# Patient Record
Sex: FEMALE | Race: BLACK OR AFRICAN AMERICAN | NOT HISPANIC OR LATINO | URBAN - NONMETROPOLITAN AREA
[De-identification: names, ages, dates, MRNs, and addresses within clinical notes are randomized per-mention and may not be internally consistent; named-entity substitution may affect disease eponyms.]

---

## 2020-10-16 ENCOUNTER — OFFICE VISIT (OUTPATIENT)
Dept: URBAN - NONMETROPOLITAN AREA CLINIC 2 | Facility: CLINIC | Age: 40
End: 2020-10-16

## 2020-10-23 ENCOUNTER — OFFICE VISIT (OUTPATIENT)
Dept: URBAN - NONMETROPOLITAN AREA CLINIC 2 | Facility: CLINIC | Age: 40
End: 2020-10-23
Payer: MEDICARE

## 2020-10-23 ENCOUNTER — DASHBOARD ENCOUNTERS (OUTPATIENT)
Age: 40
End: 2020-10-23

## 2020-10-23 ENCOUNTER — TELEPHONE ENCOUNTER (OUTPATIENT)
Dept: URBAN - NONMETROPOLITAN AREA CLINIC 2 | Facility: CLINIC | Age: 40
End: 2020-10-23

## 2020-10-23 DIAGNOSIS — Z12.11 COLON CANCER SCREENING: ICD-10-CM

## 2020-10-23 DIAGNOSIS — K31.84 GASTROPARESIS: ICD-10-CM

## 2020-10-23 DIAGNOSIS — R11.0 NAUSEA: ICD-10-CM

## 2020-10-23 DIAGNOSIS — K92.0 HEMATEMESIS: ICD-10-CM

## 2020-10-23 PROCEDURE — G9903 PT SCRN TBCO ID AS NON USER: HCPCS | Performed by: NURSE PRACTITIONER

## 2020-10-23 PROCEDURE — 99214 OFFICE O/P EST MOD 30 MIN: CPT | Performed by: NURSE PRACTITIONER

## 2020-10-23 PROCEDURE — G8482 FLU IMMUNIZE ORDER/ADMIN: HCPCS | Performed by: NURSE PRACTITIONER

## 2020-10-23 PROCEDURE — G8420 CALC BMI NORM PARAMETERS: HCPCS | Performed by: NURSE PRACTITIONER

## 2020-10-23 PROCEDURE — G8427 DOCREV CUR MEDS BY ELIG CLIN: HCPCS | Performed by: NURSE PRACTITIONER

## 2020-10-23 RX ORDER — PANTOPRAZOLE SODIUM 40 MG/1
1 TABLET TABLET, DELAYED RELEASE ORAL TWICE DAILY
Qty: 60 TABLET | Refills: 3 | OUTPATIENT
Start: 2020-10-23

## 2020-10-23 RX ORDER — ASPIRIN 81 MG/1
TABLET, COATED ORAL
Qty: 0 | Refills: 0 | Status: ACTIVE | COMMUNITY
Start: 1900-01-01

## 2020-10-23 RX ORDER — ONDANSETRON 4 MG/1
1 TABLET ON THE TONGUE AND ALLOW TO DISSOLVE TABLET, ORALLY DISINTEGRATING ORAL ONCE A DAY
Qty: 30 | OUTPATIENT
Start: 2020-10-23

## 2020-10-23 RX ORDER — METOCLOPRAMIDE HYDROCHLORIDE 10 MG/1
1 TABLET BEFORE MEALS TABLET ORAL
Qty: 120 TABLET | Refills: 3 | OUTPATIENT
Start: 2020-10-23

## 2020-10-23 RX ORDER — CALCIUM ACETATE 667 MG/1
TABLET ORAL
Qty: 0 | Refills: 0 | Status: ACTIVE | COMMUNITY
Start: 1900-01-01

## 2020-10-23 RX ORDER — METOCLOPRAMIDE 10 MG/1
TAKE 1 TABLET (10 MG) BY ORAL ROUTE 4 TIMES PER DAY 30 MINUTES BEFORE MEALS AND AT BEDTIME TABLET ORAL
Qty: 0 | Refills: 0 | Status: ACTIVE | COMMUNITY
Start: 1900-01-01

## 2020-10-23 RX ORDER — SUCRALFATE 1 G/1
1 TABLET ON AN EMPTY STOMACH TABLET ORAL THREE TIMES A DAY
Qty: 90 TABLET | Refills: 3 | OUTPATIENT
Start: 2020-10-23 | End: 2021-02-19

## 2020-10-23 RX ORDER — FAMOTIDINE 40 MG/1
TAKE 1 TABLET (40 MG) BY ORAL ROUTE ONCE DAILY AT BEDTIME FOR 90 DAYS TABLET, FILM COATED ORAL 1
Qty: 90 | Refills: 6 | Status: ACTIVE | COMMUNITY
Start: 2019-09-30 | End: 2021-06-21

## 2020-10-23 RX ORDER — PANTOPRAZOLE SODIUM 40 MG/1
TAKE 1 TABLET (40 MG) BY ORAL ROUTE 2 TIMES PER DAY FOR 90 DAYS TABLET, DELAYED RELEASE ORAL 2
Qty: 180 | Refills: 3 | Status: ACTIVE | COMMUNITY
Start: 2020-02-07 | End: 2021-02-01

## 2020-10-23 NOTE — HPI-TODAY'S VISIT:
10/23/20 Ms Jackson returns for f/u. Since her last OV, she had an UGI with unchanged HH and persistent compression of SB at the level of SMA. Dr Flor states she is not a candidate for any vascular intervention for this. She was doing well at the time and so we did pursue referral to Dr Joel or consideration of GJ. Today, she c/o heartburn at night as well as daily postpranial vomiting and coffee-ground/hematemesis. No additional complaints. It sounds like she is still on ppi, but not reglan.   1/30/20    Mrs. Jackson is a 40 yo F with a PMH of CAD s/p CABG with subsequent peripheral arterial disease leading to bilateral BKA, GP, HD M, W, F, and pancreatitis who f/u of hospital f/u. She was admitted to [facility] in Nov and Jan for SBO vs ileus and managed conservatively in both instances. She did have some hypocalcemia r/t parathyroid issues at the same time as these admissions. She states that she had loose stool and worsening nausea which prompted her to be seen in the ED. She does have chronic nausea and takes miralax for gastroparesis. She was seen by Dr Flor for SMA and he felt they were unable to offer interventions for this.   7/19 GEs incomplete due to vomiting. estimated emptying time 100 minutes.  7/18 CT moderate hiatal hernia and extensive stable calcifications in the pelvis.  5/18 UGI with hiatal hernia, reflux, and compression of the duodenum by SMA not completely obstructed.  2018 repeat EGD with Dr. Myles. Findings include nonobstructing schatzi ring, 4 cm hiatal hernia, esophagitis, retained fluid in stomach, and compression of duodenum concerning for SMA.   Previous workup includes UGI with some compression of transverse duodenum and EGD with Dr Tovar 2/15/18 with esophagitis, gastritis, 5cm hiatal hernia, and schatski ring. Her last colonoscopy was done by Dr. Pena in 2014 and was normal. Otherwise, she is doing ok. SB.

## 2020-11-11 ENCOUNTER — OFFICE VISIT (OUTPATIENT)
Dept: URBAN - METROPOLITAN AREA MEDICAL CENTER 1 | Facility: MEDICAL CENTER | Age: 40
End: 2020-11-11
Payer: MEDICARE

## 2020-11-11 DIAGNOSIS — K22.2 ESOPHAGEAL OBSTRUCTION: ICD-10-CM

## 2020-11-11 DIAGNOSIS — R11.2 ACUTE NAUSEA WITH NONBILIOUS VOMITING: ICD-10-CM

## 2020-11-11 DIAGNOSIS — K55.1 SUPERIOR MESENTERIC ARTERY SYNDROME: ICD-10-CM

## 2020-11-11 DIAGNOSIS — K31.5 DUODENAL STRICTURE: ICD-10-CM

## 2020-11-11 PROCEDURE — 43235 EGD DIAGNOSTIC BRUSH WASH: CPT | Performed by: INTERNAL MEDICINE

## 2020-11-11 RX ORDER — METOCLOPRAMIDE 10 MG/1
TAKE 1 TABLET (10 MG) BY ORAL ROUTE 4 TIMES PER DAY 30 MINUTES BEFORE MEALS AND AT BEDTIME TABLET ORAL
Qty: 0 | Refills: 0 | Status: ACTIVE | COMMUNITY
Start: 1900-01-01

## 2020-11-11 RX ORDER — PANTOPRAZOLE SODIUM 40 MG/1
TAKE 1 TABLET (40 MG) BY ORAL ROUTE 2 TIMES PER DAY FOR 90 DAYS TABLET, DELAYED RELEASE ORAL 2
Qty: 180 | Refills: 3 | Status: ACTIVE | COMMUNITY
Start: 2020-02-07 | End: 2021-02-01

## 2020-11-11 RX ORDER — CALCIUM ACETATE 667 MG/1
TABLET ORAL
Qty: 0 | Refills: 0 | Status: ACTIVE | COMMUNITY
Start: 1900-01-01

## 2020-11-11 RX ORDER — ASPIRIN 81 MG/1
TABLET, COATED ORAL
Qty: 0 | Refills: 0 | Status: ACTIVE | COMMUNITY
Start: 1900-01-01

## 2020-11-11 RX ORDER — ONDANSETRON 4 MG/1
1 TABLET ON THE TONGUE AND ALLOW TO DISSOLVE TABLET, ORALLY DISINTEGRATING ORAL ONCE A DAY
Qty: 30 | Status: ACTIVE | COMMUNITY
Start: 2020-10-23

## 2020-11-11 RX ORDER — METOCLOPRAMIDE HYDROCHLORIDE 10 MG/1
1 TABLET BEFORE MEALS TABLET ORAL
Qty: 120 TABLET | Refills: 3 | Status: ACTIVE | COMMUNITY
Start: 2020-10-23

## 2020-11-11 RX ORDER — PANTOPRAZOLE SODIUM 40 MG/1
1 TABLET TABLET, DELAYED RELEASE ORAL TWICE DAILY
Qty: 60 TABLET | Refills: 3 | Status: ACTIVE | COMMUNITY
Start: 2020-10-23

## 2020-11-11 RX ORDER — SUCRALFATE 1 G/1
1 TABLET ON AN EMPTY STOMACH TABLET ORAL THREE TIMES A DAY
Qty: 90 TABLET | Refills: 3 | Status: ACTIVE | COMMUNITY
Start: 2020-10-23 | End: 2021-02-19

## 2020-11-11 RX ORDER — FAMOTIDINE 40 MG/1
TAKE 1 TABLET (40 MG) BY ORAL ROUTE ONCE DAILY AT BEDTIME FOR 90 DAYS TABLET, FILM COATED ORAL 1
Qty: 90 | Refills: 6 | Status: ACTIVE | COMMUNITY
Start: 2019-09-30 | End: 2021-06-21

## 2020-11-18 ENCOUNTER — TELEPHONE ENCOUNTER (OUTPATIENT)
Dept: URBAN - METROPOLITAN AREA CLINIC 92 | Facility: CLINIC | Age: 40
End: 2020-11-18

## 2020-12-17 ENCOUNTER — OFFICE VISIT (OUTPATIENT)
Dept: URBAN - NONMETROPOLITAN AREA CLINIC 2 | Facility: CLINIC | Age: 40
End: 2020-12-17

## 2020-12-17 RX ORDER — CALCIUM ACETATE 667 MG/1
TABLET ORAL
Qty: 0 | Refills: 0 | Status: ACTIVE | COMMUNITY
Start: 1900-01-01

## 2020-12-17 RX ORDER — FAMOTIDINE 40 MG/1
TAKE 1 TABLET (40 MG) BY ORAL ROUTE ONCE DAILY AT BEDTIME FOR 90 DAYS TABLET, FILM COATED ORAL 1
Qty: 90 | Refills: 6 | Status: ACTIVE | COMMUNITY
Start: 2019-09-30 | End: 2021-06-21

## 2020-12-17 RX ORDER — ASPIRIN 81 MG/1
TABLET, COATED ORAL
Qty: 0 | Refills: 0 | Status: ACTIVE | COMMUNITY
Start: 1900-01-01

## 2020-12-17 RX ORDER — ONDANSETRON 4 MG/1
1 TABLET ON THE TONGUE AND ALLOW TO DISSOLVE TABLET, ORALLY DISINTEGRATING ORAL ONCE A DAY
Qty: 30 | Status: ACTIVE | COMMUNITY
Start: 2020-10-23

## 2020-12-17 RX ORDER — METOCLOPRAMIDE 10 MG/1
TAKE 1 TABLET (10 MG) BY ORAL ROUTE 4 TIMES PER DAY 30 MINUTES BEFORE MEALS AND AT BEDTIME TABLET ORAL
Qty: 0 | Refills: 0 | Status: ACTIVE | COMMUNITY
Start: 1900-01-01

## 2020-12-17 RX ORDER — SUCRALFATE 1 G/1
1 TABLET ON AN EMPTY STOMACH TABLET ORAL THREE TIMES A DAY
Qty: 90 TABLET | Refills: 3 | Status: ACTIVE | COMMUNITY
Start: 2020-10-23 | End: 2021-02-19

## 2020-12-17 RX ORDER — PANTOPRAZOLE SODIUM 40 MG/1
TAKE 1 TABLET (40 MG) BY ORAL ROUTE 2 TIMES PER DAY FOR 90 DAYS TABLET, DELAYED RELEASE ORAL 2
Qty: 180 | Refills: 3 | Status: ACTIVE | COMMUNITY
Start: 2020-02-07 | End: 2021-02-01

## 2020-12-17 RX ORDER — PANTOPRAZOLE SODIUM 40 MG/1
1 TABLET TABLET, DELAYED RELEASE ORAL TWICE DAILY
Qty: 60 TABLET | Refills: 3 | Status: ACTIVE | COMMUNITY
Start: 2020-10-23

## 2020-12-17 RX ORDER — METOCLOPRAMIDE HYDROCHLORIDE 10 MG/1
1 TABLET BEFORE MEALS TABLET ORAL
Qty: 120 TABLET | Refills: 3 | Status: ACTIVE | COMMUNITY
Start: 2020-10-23

## 2020-12-17 NOTE — HPI-TODAY'S VISIT:
10/23/20 Ms Jackson returns for f/u. Since her last OV, she had an UGI with unchanged HH and persistent compression of SB at the level of SMA. Dr Flor states she is not a candidate for any vascular intervention for this. She was doing well at the time and so we did pursue referral to Dr Joel or consideration of GJ. Today, she c/o heartburn at night as well as daily postpranial vomiting and coffee-ground/hematemesis. No additional complaints. It sounds like she is still on ppi, but not reglan.   1/30/20    Mrs. Jackson is a 38 yo F with a PMH of CAD s/p CABG with subsequent peripheral arterial disease leading to bilateral BKA, GP, HD M, W, F, and pancreatitis who f/u of hospital f/u. She was admitted to [facility] in Nov and Jan for SBO vs ileus and managed conservatively in both instances. She did have some hypocalcemia r/t parathyroid issues at the same time as these admissions. She states that she had loose stool and worsening nausea which prompted her to be seen in the ED. She does have chronic nausea and takes miralax for gastroparesis. She was seen by Dr Flor for SMA and he felt they were unable to offer interventions for this.   7/19 GEs incomplete due to vomiting. estimated emptying time 100 minutes.  7/18 CT moderate hiatal hernia and extensive stable calcifications in the pelvis.  5/18 UGI with hiatal hernia, reflux, and compression of the duodenum by SMA not completely obstructed.  2018 repeat EGD with Dr. Myles. Findings include nonobstructing schatzi ring, 4 cm hiatal hernia, esophagitis, retained fluid in stomach, and compression of duodenum concerning for SMA.   Previous workup includes UGI with some compression of transverse duodenum and EGD with Dr Tovar 2/15/18 with esophagitis, gastritis, 5cm hiatal hernia, and schatski ring. Her last colonoscopy was done by Dr. Pena in 2014 and was normal. Otherwise, she is doing ok. SB.

## 2021-08-28 ENCOUNTER — TELEPHONE ENCOUNTER (OUTPATIENT)
Dept: URBAN - METROPOLITAN AREA CLINIC 13 | Facility: CLINIC | Age: 41
End: 2021-08-28

## 2021-08-29 ENCOUNTER — TELEPHONE ENCOUNTER (OUTPATIENT)
Dept: URBAN - METROPOLITAN AREA CLINIC 13 | Facility: CLINIC | Age: 41
End: 2021-08-29